# Patient Record
Sex: MALE | Race: WHITE | NOT HISPANIC OR LATINO | Employment: FULL TIME | URBAN - METROPOLITAN AREA
[De-identification: names, ages, dates, MRNs, and addresses within clinical notes are randomized per-mention and may not be internally consistent; named-entity substitution may affect disease eponyms.]

---

## 2022-03-12 ENCOUNTER — HOSPITAL ENCOUNTER (EMERGENCY)
Facility: CLINIC | Age: 52
Discharge: HOME | End: 2022-03-12
Attending: STUDENT IN AN ORGANIZED HEALTH CARE EDUCATION/TRAINING PROGRAM | Admitting: STUDENT IN AN ORGANIZED HEALTH CARE EDUCATION/TRAINING PROGRAM
Payer: COMMERCIAL

## 2022-03-12 VITALS
SYSTOLIC BLOOD PRESSURE: 128 MMHG | OXYGEN SATURATION: 98 % | RESPIRATION RATE: 16 BRPM | TEMPERATURE: 98.9 F | HEART RATE: 82 BPM | DIASTOLIC BLOOD PRESSURE: 78 MMHG

## 2022-03-12 DIAGNOSIS — M23.8X1 DERANGEMENT OF RIGHT KNEE LIGAMENT: ICD-10-CM

## 2022-03-12 PROCEDURE — 99282 EMERGENCY DEPT VISIT SF MDM: CPT | Performed by: STUDENT IN AN ORGANIZED HEALTH CARE EDUCATION/TRAINING PROGRAM

## 2022-03-12 ASSESSMENT — ENCOUNTER SYMPTOMS
DIARRHEA: 0
EYE PAIN: 0
ABDOMINAL DISTENTION: 0
COUGH: 0
FEVER: 0
BACK PAIN: 0
WHEEZING: 0
CHILLS: 0
ABDOMINAL PAIN: 0
HEADACHES: 0
NAUSEA: 0
WEAKNESS: 0
NECK PAIN: 0
DIZZINESS: 0
HEMATURIA: 0
EYE DISCHARGE: 0
ARTHRALGIAS: 1
SHORTNESS OF BREATH: 0
RHINORRHEA: 0
WOUND: 0
NUMBNESS: 0
SORE THROAT: 0
VOMITING: 0

## 2022-03-12 NOTE — Clinical Note
Carlos Obrien was seen and treated in our emergency department on 3/12/2022.  He may return to work on 03/17/2022.       If you have any questions or concerns, please don't hesitate to call.      Jude Castellanos MD

## 2022-03-12 NOTE — ED PROVIDER NOTES
"    [facility] EMERGENCY DEPARTMENT ([facility])  3/12/22  History     Chief Complaint   Patient presents with     Knee Pain     The history is provided by the patient and medical records.     Carlos Obrien is a 52 year old male who has a significant medical history of HTN who presents to the Emergency Department with c/o right knee pain right knee swelling.    Patient reports that two days ago he was walking his dog when he felt a \"weird pop\" sensation in his right knee. He says that he was on an airplane last night and when he got off the airplane he could barely walk. He adds that the pain in his knee has progressively gotten worse. He notes today he could hardly walk without pain. He adds that right knee is also swollen in nature. 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 Denies recent trauma to the right knee. Patient denies fever and chills. Patient denies past surgical history to the right knee.     No past medical history on file.    No past surgical history on file.    No family history on file.    Social History     Tobacco Use     Smoking status: Not on file     Smokeless tobacco: Not on file   Substance Use Topics     Alcohol use: Not on file       No current facility-administered medications for this encounter.     No current outpatient medications on file.      No Known Allergies     I have reviewed the Medications, Allergies, Past Medical and Surgical History, and Social History in the Epic system.    Review of Systems   Constitutional: Negative for chills and fever.   HENT: Negative for congestion, rhinorrhea and sore throat.    Eyes: Negative for pain and discharge.   Respiratory: Negative for cough, shortness of breath and wheezing.    Cardiovascular: Negative for chest pain and leg swelling.   Gastrointestinal: Negative for abdominal distention, abdominal pain, diarrhea, nausea and vomiting.   Genitourinary: Negative for hematuria and urgency. "   Musculoskeletal: Positive for arthralgias. Negative for back pain and neck pain.        Positive for right knee pain and swelling   Skin: Negative for rash and wound.   Neurological: Negative for dizziness, weakness, numbness and headaches.   All other systems reviewed and are negative.    A complete review of systems was performed with pertinent positives and negatives noted in the HPI, and all other systems negative.    Physical Exam   BP: 135/79  Pulse: 105  Temp: 98.9  F (37.2  C)  Resp: 15  SpO2: 94 %      Physical Exam  Constitutional:       General: He is awake. He is not in acute distress.     Appearance: Normal appearance.   HENT:      Head: Normocephalic and atraumatic.      Nose: Nose normal.      Mouth/Throat:      Mouth: Mucous membranes are dry.      Pharynx: Oropharynx is clear.   Eyes:      General: Lids are normal.      Extraocular Movements: Extraocular movements intact.      Conjunctiva/sclera: Conjunctivae normal.      Pupils: Pupils are equal, round, and reactive to light.   Cardiovascular:      Rate and Rhythm: Normal rate and regular rhythm.      Pulses: Normal pulses.      Heart sounds: Normal heart sounds. No murmur heard.    No friction rub. No gallop.   Pulmonary:      Effort: Pulmonary effort is normal. No respiratory distress.      Breath sounds: Normal breath sounds. No stridor. No wheezing or rhonchi.   Abdominal:      General: Abdomen is flat.      Palpations: Abdomen is soft.   Musculoskeletal:         General: Normal range of motion.      Right shoulder: Normal.      Left shoulder: Normal.      Right upper arm: Normal.      Left upper arm: Normal.      Right elbow: Normal.      Left elbow: Normal.      Right forearm: Normal.      Left forearm: Normal.      Right wrist: Normal.      Left wrist: Normal.      Right hand: Normal.      Left hand: Normal.      Cervical back: Full passive range of motion without pain, normal range of motion and neck supple.      Thoracic back: Normal.       Lumbar back: Normal.      Right hip: Normal.      Left hip: Normal.      Right upper leg: Normal.      Left upper leg: Normal.      Right knee: Swelling present. No deformity, effusion, erythema, ecchymosis, lacerations or crepitus. Tenderness present over the LCL. LCL laxity present. Normal alignment, normal meniscus and normal patellar mobility.      Instability Tests: Anterior drawer test negative. Posterior drawer test negative. Medial Gerry test negative.      Left knee: Normal.      Right lower leg: Normal.      Left lower leg: Normal.      Right ankle: Normal.      Left ankle: Normal.      Right foot: Normal.      Left foot: Normal.   Skin:     General: Skin is warm and dry.      Capillary Refill: Capillary refill takes less than 2 seconds.   Neurological:      General: No focal deficit present.      Mental Status: He is alert and oriented to person, place, and time. Mental status is at baseline.   Psychiatric:         Attention and Perception: Attention normal.         Mood and Affect: Mood normal.         Behavior: Behavior normal. Behavior is cooperative.         ED Course     At 11:01 AM the patient was seen and examined by Jude Castellanos MD in Room [facility].          No results found for this or any previous visit (from the past 24 hour(s)).  Medications - No data to display          Assessments & Plan (with Medical Decision Making)   This is a 52 year old male who presents to the ED for evaluation of an injury to the right knee. Based on the mechanism of injury and exam findings I am most concerned about the possibility of ligamentous versus tendon injury. Differential includes contusion, sprain, strain, tendinitis     Given lack of trauma, I have low degree of concern for a fracture, do not feel that imaging is warranted at this time, no clinical findings concerning for a DVT.  No clinical findings consistent with an acute infectious process, given lack of pain with range of motion, I feel that a  septic joint is extremely unlikely.  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  Both patient is wife agree that no further work-up should be done today, will  patient on rice therapy, provide him with a work note and have arranged for him to follow-up back in [place], otherwise we will provide referral here today.    The patient's workup and evaluation during their Emergency Department stay was reviewed with the patient. He is comfortable going home based on our discussion with him. They are amenable to this plan. They will follow up with PCP in 3 days time. The signs/symptoms to prompt return to the Emergency Department were discussed with the patient and they expressed understanding. All questions were answered.       I have reviewed the nursing notes.    I have reviewed the findings, diagnosis, plan and need for follow up with the patient.    New Prescriptions    No medications on file       Final diagnoses:   Derangement of right knee ligament       I, Jordan Mario am serving as a trained medical scribe to document services personally performed by Jude Castellanos, based on the provider's statements to me.      I, Jude Castellanos MD, was physically present and have reviewed and verified the accuracy of this note documented by Jordan Mario.     Jude Castellanos MD  3/12/2022   [facility] EMERGENCY DEPARTMENT     Jude Castellanos MD  03/12/22 1148     Name band;

## 2022-03-12 NOTE — DISCHARGE INSTRUCTIONS
Please take all medications as prescribed and instructed, naproxen and acetaminophen. Please follow up with your primary care provider as discussed, return to the Emergency Department should your symptoms worsen or change.

## 2022-03-12 NOTE — ED TRIAGE NOTES
"Pt brought to triage with c/o right knee pain. Pt states he took a flight yesterday evening, and noted his right knee began to \"swell up\". Pt states 7/10 pain at knee when weight bearing, endorses taking Advil for pain. Pt states he's worried about it \"popping\", denies any trauma to site. HX of HTN.   "